# Patient Record
Sex: FEMALE
[De-identification: names, ages, dates, MRNs, and addresses within clinical notes are randomized per-mention and may not be internally consistent; named-entity substitution may affect disease eponyms.]

---

## 2024-01-31 ENCOUNTER — APPOINTMENT (OUTPATIENT)
Dept: ORTHOPEDIC SURGERY | Facility: CLINIC | Age: 44
End: 2024-01-31
Payer: COMMERCIAL

## 2024-01-31 VITALS — BODY MASS INDEX: 26.12 KG/M2 | HEIGHT: 64 IN | WEIGHT: 153 LBS

## 2024-01-31 DIAGNOSIS — Z78.9 OTHER SPECIFIED HEALTH STATUS: ICD-10-CM

## 2024-01-31 DIAGNOSIS — M75.01 ADHESIVE CAPSULITIS OF RIGHT SHOULDER: ICD-10-CM

## 2024-01-31 PROBLEM — Z00.00 ENCOUNTER FOR PREVENTIVE HEALTH EXAMINATION: Status: ACTIVE | Noted: 2024-01-31

## 2024-01-31 PROCEDURE — 99214 OFFICE O/P EST MOD 30 MIN: CPT | Mod: 25

## 2024-01-31 PROCEDURE — 73010 X-RAY EXAM OF SHOULDER BLADE: CPT | Mod: RT

## 2024-01-31 PROCEDURE — 20611 DRAIN/INJ JOINT/BURSA W/US: CPT | Mod: RT

## 2024-01-31 PROCEDURE — 73030 X-RAY EXAM OF SHOULDER: CPT | Mod: RT

## 2024-01-31 NOTE — CONSULT LETTER
[Dear  ___] : Dear  [unfilled], [Consult Letter:] : I had the pleasure of evaluating your patient, [unfilled]. [Please see my note below.] : Please see my note below. [Consult Closing:] : Thank you very much for allowing me to participate in the care of this patient.  If you have any questions, please do not hesitate to contact me. [Sincerely,] : Sincerely, [FreeTextEntry3] : Asa Watson M.D. Shoulder Surgery

## 2024-01-31 NOTE — REASON FOR VISIT
[FreeTextEntry2] : This is a 43 year old RHD female  with right shoulder pain and stiffness since November 2023 without injury. No prior history/treatment. Reaching is painful. Night symptoms can occur. There is no n/t. No meds.

## 2024-01-31 NOTE — PHYSICAL EXAM
[Right] : right shoulder [Moderate] : moderate [5___] : external rotation 5[unfilled]/5 [] : no sensory deficits [Left] : left shoulder [Sitting] : sitting [FreeTextEntry9] : IR to T8. [TWNoteComboBox6] : internal rotation sacrum [TWNoteComboBox4] : passive forward flexion 165 degrees [de-identified] : external rotation 70 degrees

## 2024-01-31 NOTE — ASSESSMENT
[FreeTextEntry1] : We discussed the underlying pathology.  Treatment options reviewed.  An injection is planned today.  PT is prescribed.  Cautions discussed.  Questions answered.  Patient seen by Asa Watson M.D. Entered by Rebecca Chao acting as scribe.   Procedure Name: Large Joint Injection / Aspiration: Depomedrol, Lidocaine and Guidance Ultrasound  Large Joint Injection was performed because of pain and inflammation. Depomedrol: An injection of Depomedrol 40 mg , 2 cc. Lidocaine: An injection of Lidocaine 1 mg , 13 cc.  Medication was injected in the right subacromial space and glenohumeral joint. Patient has tried OTC's including aspirin, Ibuprofen, Aleve etc or prescription NSAIDS, and/or exercises at home and/ or physical therapy without satisfactory response. The risks, benefits, and alternatives to steroid injection were explained in full to the patient. Risks outlined include but are not limited to infection, sepsis, bleeding, scarring, skin discoloration, temporary increase in pain, syncopal episode, failure to resolve symptoms, allergic reaction, symptom recurrence, and elevation of blood sugar in diabetics. Patient understood the risks. All questions were answered. After discussion, patient requested an injection. Oral informed consent was obtained.  Sterile preparation with betadine and aseptic technique was utilized for the procedure, including the preparation of the solutions used for the injection. Patient tolerated the procedure well.   Post Procedure Instructions: Patient was advised to call if redness, pain, or fever occur and apply ice for 15 min. out of every hour for the next 12-24 hours as tolerated. Patient was advised to rest the joint(s) for 3 days.  Advised to ice the injection site this evening. Ultrasound Guidance was used for the following reasons: for precise injection in area of tear. Visualization of the needle and placement of injection was performed without complication.

## 2024-01-31 NOTE — HISTORY OF PRESENT ILLNESS
[9] : 9 [2] : 2 [Shooting] : shooting [Frequent] : frequent [Household chores] : household chores [Leisure] : leisure [Sleep] : sleep [Rest] : rest [Full time] : Work status: full time [de-identified] : Right shoulder pain for 2 months. No specific injury. [] : no [FreeTextEntry1] : Right shoulder [de-identified] : pulling, overhead

## 2024-01-31 NOTE — IMAGING
[Right] : right shoulder [FreeTextEntry1] : The GH and AC joints are ok.  [FreeTextEntry5] : There is a type I acromion with a small spur.